# Patient Record
Sex: MALE | Race: WHITE | NOT HISPANIC OR LATINO | Employment: PART TIME | URBAN - NONMETROPOLITAN AREA
[De-identification: names, ages, dates, MRNs, and addresses within clinical notes are randomized per-mention and may not be internally consistent; named-entity substitution may affect disease eponyms.]

---

## 2023-12-21 ENCOUNTER — HOSPITAL ENCOUNTER (EMERGENCY)
Facility: HOSPITAL | Age: 28
Discharge: STILL ADMITTED OR ADMINISTRATIVE | DRG: 897 | End: 2023-12-21
Payer: MEDICAID

## 2023-12-21 ENCOUNTER — HOSPITAL ENCOUNTER (INPATIENT)
Facility: HOSPITAL | Age: 28
LOS: 6 days | Discharge: NURSING, REHAB OR LONG-TERM CARE | DRG: 897 | End: 2023-12-27
Attending: PSYCHIATRY & NEUROLOGY | Admitting: PSYCHIATRY & NEUROLOGY
Payer: MEDICAID

## 2023-12-21 VITALS
HEIGHT: 71 IN | RESPIRATION RATE: 17 BRPM | TEMPERATURE: 98.1 F | SYSTOLIC BLOOD PRESSURE: 138 MMHG | BODY MASS INDEX: 28.76 KG/M2 | DIASTOLIC BLOOD PRESSURE: 99 MMHG | HEART RATE: 103 BPM | OXYGEN SATURATION: 99 % | WEIGHT: 205.4 LBS

## 2023-12-21 DIAGNOSIS — F10.10 ALCOHOL ABUSE: Primary | ICD-10-CM

## 2023-12-21 PROBLEM — F19.20 CHEMICAL DEPENDENCY: Status: ACTIVE | Noted: 2023-12-21

## 2023-12-21 LAB
ALBUMIN SERPL-MCNC: 5 G/DL (ref 3.5–5.2)
ALBUMIN/GLOB SERPL: 1.7 G/DL
ALP SERPL-CCNC: 90 U/L (ref 39–117)
ALT SERPL W P-5'-P-CCNC: 25 U/L (ref 1–41)
AMPHET+METHAMPHET UR QL: NEGATIVE
AMPHETAMINES UR QL: NEGATIVE
ANION GAP SERPL CALCULATED.3IONS-SCNC: 10.2 MMOL/L (ref 5–15)
APAP SERPL-MCNC: <5 MCG/ML (ref 0–30)
AST SERPL-CCNC: 24 U/L (ref 1–40)
BACTERIA UR QL AUTO: NORMAL /HPF
BARBITURATES UR QL SCN: NEGATIVE
BASOPHILS # BLD AUTO: 0.07 10*3/MM3 (ref 0–0.2)
BASOPHILS NFR BLD AUTO: 0.8 % (ref 0–1.5)
BENZODIAZ UR QL SCN: NEGATIVE
BILIRUB SERPL-MCNC: 0.2 MG/DL (ref 0–1.2)
BILIRUB UR QL STRIP: NEGATIVE
BUN SERPL-MCNC: 11 MG/DL (ref 6–20)
BUN/CREAT SERPL: 10.5 (ref 7–25)
BUPRENORPHINE SERPL-MCNC: POSITIVE NG/ML
CALCIUM SPEC-SCNC: 10.1 MG/DL (ref 8.6–10.5)
CANNABINOIDS SERPL QL: NEGATIVE
CHLORIDE SERPL-SCNC: 104 MMOL/L (ref 98–107)
CLARITY UR: CLEAR
CO2 SERPL-SCNC: 23.8 MMOL/L (ref 22–29)
COCAINE UR QL: NEGATIVE
COLOR UR: ABNORMAL
CREAT SERPL-MCNC: 1.05 MG/DL (ref 0.76–1.27)
DEPRECATED RDW RBC AUTO: 41.1 FL (ref 37–54)
EGFRCR SERPLBLD CKD-EPI 2021: 97.9 ML/MIN/1.73
EOSINOPHIL # BLD AUTO: 0.15 10*3/MM3 (ref 0–0.4)
EOSINOPHIL NFR BLD AUTO: 1.6 % (ref 0.3–6.2)
ERYTHROCYTE [DISTWIDTH] IN BLOOD BY AUTOMATED COUNT: 11.8 % (ref 12.3–15.4)
ETHANOL BLD-MCNC: <10 MG/DL (ref 0–10)
ETHANOL UR QL: <0.01 %
FENTANYL UR-MCNC: NEGATIVE NG/ML
GLOBULIN UR ELPH-MCNC: 2.9 GM/DL
GLUCOSE SERPL-MCNC: 115 MG/DL (ref 65–99)
GLUCOSE UR STRIP-MCNC: NEGATIVE MG/DL
HCT VFR BLD AUTO: 48.6 % (ref 37.5–51)
HGB BLD-MCNC: 16 G/DL (ref 13–17.7)
HGB UR QL STRIP.AUTO: NEGATIVE
HOLD SPECIMEN: NORMAL
HOLD SPECIMEN: NORMAL
HYALINE CASTS UR QL AUTO: NORMAL /LPF
IMM GRANULOCYTES # BLD AUTO: 0.04 10*3/MM3 (ref 0–0.05)
IMM GRANULOCYTES NFR BLD AUTO: 0.4 % (ref 0–0.5)
KETONES UR QL STRIP: ABNORMAL
LEUKOCYTE ESTERASE UR QL STRIP.AUTO: NEGATIVE
LYMPHOCYTES # BLD AUTO: 2.62 10*3/MM3 (ref 0.7–3.1)
LYMPHOCYTES NFR BLD AUTO: 28.3 % (ref 19.6–45.3)
MAGNESIUM SERPL-MCNC: 2 MG/DL (ref 1.6–2.6)
MCH RBC QN AUTO: 31.3 PG (ref 26.6–33)
MCHC RBC AUTO-ENTMCNC: 32.9 G/DL (ref 31.5–35.7)
MCV RBC AUTO: 94.9 FL (ref 79–97)
METHADONE UR QL SCN: NEGATIVE
MONOCYTES # BLD AUTO: 0.54 10*3/MM3 (ref 0.1–0.9)
MONOCYTES NFR BLD AUTO: 5.8 % (ref 5–12)
NEUTROPHILS NFR BLD AUTO: 5.83 10*3/MM3 (ref 1.7–7)
NEUTROPHILS NFR BLD AUTO: 63.1 % (ref 42.7–76)
NITRITE UR QL STRIP: NEGATIVE
NRBC BLD AUTO-RTO: 0 /100 WBC (ref 0–0.2)
OPIATES UR QL: NEGATIVE
OXYCODONE UR QL SCN: NEGATIVE
PCP UR QL SCN: NEGATIVE
PH UR STRIP.AUTO: 8.5 [PH] (ref 5–8)
PLATELET # BLD AUTO: 303 10*3/MM3 (ref 140–450)
PMV BLD AUTO: 8.4 FL (ref 6–12)
POTASSIUM SERPL-SCNC: 4.3 MMOL/L (ref 3.5–5.2)
PROT SERPL-MCNC: 7.9 G/DL (ref 6–8.5)
PROT UR QL STRIP: ABNORMAL
RBC # BLD AUTO: 5.12 10*6/MM3 (ref 4.14–5.8)
RBC # UR STRIP: NORMAL /HPF
REF LAB TEST METHOD: NORMAL
SALICYLATES SERPL-MCNC: 0.3 MG/DL
SODIUM SERPL-SCNC: 138 MMOL/L (ref 136–145)
SP GR UR STRIP: >=1.03 (ref 1–1.03)
SQUAMOUS #/AREA URNS HPF: NORMAL /HPF
TRICYCLICS UR QL SCN: NEGATIVE
UROBILINOGEN UR QL STRIP: ABNORMAL
WBC # UR STRIP: NORMAL /HPF
WBC NRBC COR # BLD AUTO: 9.25 10*3/MM3 (ref 3.4–10.8)
WHOLE BLOOD HOLD COAG: NORMAL
WHOLE BLOOD HOLD SPECIMEN: NORMAL

## 2023-12-21 PROCEDURE — 82077 ASSAY SPEC XCP UR&BREATH IA: CPT | Performed by: STUDENT IN AN ORGANIZED HEALTH CARE EDUCATION/TRAINING PROGRAM

## 2023-12-21 PROCEDURE — 80143 DRUG ASSAY ACETAMINOPHEN: CPT | Performed by: STUDENT IN AN ORGANIZED HEALTH CARE EDUCATION/TRAINING PROGRAM

## 2023-12-21 PROCEDURE — 80307 DRUG TEST PRSMV CHEM ANLYZR: CPT | Performed by: STUDENT IN AN ORGANIZED HEALTH CARE EDUCATION/TRAINING PROGRAM

## 2023-12-21 PROCEDURE — 83735 ASSAY OF MAGNESIUM: CPT | Performed by: STUDENT IN AN ORGANIZED HEALTH CARE EDUCATION/TRAINING PROGRAM

## 2023-12-21 PROCEDURE — 93005 ELECTROCARDIOGRAM TRACING: CPT | Performed by: PSYCHIATRY & NEUROLOGY

## 2023-12-21 PROCEDURE — 80179 DRUG ASSAY SALICYLATE: CPT | Performed by: STUDENT IN AN ORGANIZED HEALTH CARE EDUCATION/TRAINING PROGRAM

## 2023-12-21 PROCEDURE — G0378 HOSPITAL OBSERVATION PER HR: HCPCS

## 2023-12-21 PROCEDURE — 36415 COLL VENOUS BLD VENIPUNCTURE: CPT

## 2023-12-21 PROCEDURE — 81001 URINALYSIS AUTO W/SCOPE: CPT | Performed by: STUDENT IN AN ORGANIZED HEALTH CARE EDUCATION/TRAINING PROGRAM

## 2023-12-21 PROCEDURE — 80053 COMPREHEN METABOLIC PANEL: CPT | Performed by: STUDENT IN AN ORGANIZED HEALTH CARE EDUCATION/TRAINING PROGRAM

## 2023-12-21 PROCEDURE — 85025 COMPLETE CBC W/AUTO DIFF WBC: CPT | Performed by: STUDENT IN AN ORGANIZED HEALTH CARE EDUCATION/TRAINING PROGRAM

## 2023-12-21 PROCEDURE — 99285 EMERGENCY DEPT VISIT HI MDM: CPT

## 2023-12-21 RX ORDER — ALUMINA, MAGNESIA, AND SIMETHICONE 2400; 2400; 240 MG/30ML; MG/30ML; MG/30ML
15 SUSPENSION ORAL EVERY 6 HOURS PRN
Status: DISCONTINUED | OUTPATIENT
Start: 2023-12-21 | End: 2023-12-27 | Stop reason: HOSPADM

## 2023-12-21 RX ORDER — ACETAMINOPHEN 325 MG/1
650 TABLET ORAL EVERY 6 HOURS PRN
Status: DISCONTINUED | OUTPATIENT
Start: 2023-12-21 | End: 2023-12-27 | Stop reason: HOSPADM

## 2023-12-21 RX ORDER — FAMOTIDINE 20 MG/1
20 TABLET, FILM COATED ORAL 2 TIMES DAILY PRN
Status: DISCONTINUED | OUTPATIENT
Start: 2023-12-21 | End: 2023-12-27 | Stop reason: HOSPADM

## 2023-12-21 RX ORDER — HYDROXYZINE 50 MG/1
50 TABLET, FILM COATED ORAL EVERY 6 HOURS PRN
Status: DISCONTINUED | OUTPATIENT
Start: 2023-12-21 | End: 2023-12-27 | Stop reason: HOSPADM

## 2023-12-21 RX ORDER — BENZONATATE 100 MG/1
100 CAPSULE ORAL 3 TIMES DAILY PRN
Status: DISCONTINUED | OUTPATIENT
Start: 2023-12-21 | End: 2023-12-27 | Stop reason: HOSPADM

## 2023-12-21 RX ORDER — ONDANSETRON 4 MG/1
4 TABLET, FILM COATED ORAL EVERY 6 HOURS PRN
Status: DISCONTINUED | OUTPATIENT
Start: 2023-12-21 | End: 2023-12-27 | Stop reason: HOSPADM

## 2023-12-21 RX ORDER — TRAZODONE HYDROCHLORIDE 50 MG/1
50 TABLET ORAL NIGHTLY PRN
Status: DISCONTINUED | OUTPATIENT
Start: 2023-12-21 | End: 2023-12-27 | Stop reason: HOSPADM

## 2023-12-21 RX ORDER — BENZTROPINE MESYLATE 1 MG/ML
1 INJECTION INTRAMUSCULAR; INTRAVENOUS ONCE AS NEEDED
Status: DISCONTINUED | OUTPATIENT
Start: 2023-12-21 | End: 2023-12-27 | Stop reason: HOSPADM

## 2023-12-21 RX ORDER — IBUPROFEN 400 MG/1
400 TABLET ORAL EVERY 6 HOURS PRN
Status: DISCONTINUED | OUTPATIENT
Start: 2023-12-21 | End: 2023-12-27 | Stop reason: HOSPADM

## 2023-12-21 RX ORDER — LOPERAMIDE HYDROCHLORIDE 2 MG/1
2 CAPSULE ORAL
Status: DISCONTINUED | OUTPATIENT
Start: 2023-12-21 | End: 2023-12-27 | Stop reason: HOSPADM

## 2023-12-21 RX ORDER — ECHINACEA PURPUREA EXTRACT 125 MG
2 TABLET ORAL AS NEEDED
Status: DISCONTINUED | OUTPATIENT
Start: 2023-12-21 | End: 2023-12-27 | Stop reason: HOSPADM

## 2023-12-21 RX ORDER — BENZTROPINE MESYLATE 1 MG/1
2 TABLET ORAL ONCE AS NEEDED
Status: DISCONTINUED | OUTPATIENT
Start: 2023-12-21 | End: 2023-12-27 | Stop reason: HOSPADM

## 2023-12-22 PROBLEM — F11.20 OPIOID USE DISORDER, SEVERE, DEPENDENCE: Status: ACTIVE | Noted: 2023-12-22

## 2023-12-22 PROBLEM — F10.10 ALCOHOL ABUSE: Status: ACTIVE | Noted: 2023-12-22

## 2023-12-22 PROBLEM — F10.20 ALCOHOL USE DISORDER, SEVERE, DEPENDENCE: Status: ACTIVE | Noted: 2023-12-21

## 2023-12-22 PROBLEM — F17.200 NICOTINE USE DISORDER: Status: ACTIVE | Noted: 2023-12-22

## 2023-12-22 LAB
QT INTERVAL: 432 MS
QTC INTERVAL: 432 MS

## 2023-12-22 PROCEDURE — 99222 1ST HOSP IP/OBS MODERATE 55: CPT | Performed by: PSYCHIATRY & NEUROLOGY

## 2023-12-22 PROCEDURE — 93010 ELECTROCARDIOGRAM REPORT: CPT | Performed by: INTERNAL MEDICINE

## 2023-12-22 PROCEDURE — 63710000001 ONDANSETRON PER 8 MG: Performed by: PSYCHIATRY & NEUROLOGY

## 2023-12-22 RX ORDER — LORAZEPAM 1 MG/1
1 TABLET ORAL EVERY 4 HOURS PRN
Status: ACTIVE | OUTPATIENT
Start: 2023-12-25 | End: 2023-12-26

## 2023-12-22 RX ORDER — LORAZEPAM 0.5 MG/1
0.5 TABLET ORAL EVERY 4 HOURS PRN
Status: ACTIVE | OUTPATIENT
Start: 2023-12-26 | End: 2023-12-27

## 2023-12-22 RX ORDER — CLONIDINE HYDROCHLORIDE 0.1 MG/1
0.1 TABLET ORAL 3 TIMES DAILY PRN
Status: ACTIVE | OUTPATIENT
Start: 2023-12-24 | End: 2023-12-25

## 2023-12-22 RX ORDER — CLONIDINE HYDROCHLORIDE 0.1 MG/1
0.1 TABLET ORAL 4 TIMES DAILY PRN
Status: ACTIVE | OUTPATIENT
Start: 2023-12-22 | End: 2023-12-23

## 2023-12-22 RX ORDER — LORAZEPAM 1 MG/1
1 TABLET ORAL
Qty: 3 TABLET | Refills: 0 | Status: COMPLETED | OUTPATIENT
Start: 2023-12-25 | End: 2023-12-25

## 2023-12-22 RX ORDER — LORAZEPAM 2 MG/1
2 TABLET ORAL EVERY 4 HOURS PRN
Status: DISPENSED | OUTPATIENT
Start: 2023-12-23 | End: 2023-12-24

## 2023-12-22 RX ORDER — HYDRALAZINE HYDROCHLORIDE 25 MG/1
25 TABLET, FILM COATED ORAL DAILY PRN
Status: ACTIVE | OUTPATIENT
Start: 2023-12-22 | End: 2023-12-27

## 2023-12-22 RX ORDER — LORAZEPAM 0.5 MG/1
0.5 TABLET ORAL
Qty: 3 TABLET | Refills: 0 | Status: COMPLETED | OUTPATIENT
Start: 2023-12-26 | End: 2023-12-26

## 2023-12-22 RX ORDER — DICYCLOMINE HYDROCHLORIDE 10 MG/1
10 CAPSULE ORAL 3 TIMES DAILY PRN
Status: ACTIVE | OUTPATIENT
Start: 2023-12-22 | End: 2023-12-27

## 2023-12-22 RX ORDER — LORAZEPAM 2 MG/1
2 TABLET ORAL
Status: DISPENSED | OUTPATIENT
Start: 2023-12-22 | End: 2023-12-23

## 2023-12-22 RX ORDER — LORAZEPAM 2 MG/1
2 TABLET ORAL
Qty: 3 TABLET | Refills: 0 | Status: COMPLETED | OUTPATIENT
Start: 2023-12-23 | End: 2023-12-23

## 2023-12-22 RX ORDER — CLONIDINE HYDROCHLORIDE 0.1 MG/1
0.1 TABLET ORAL 4 TIMES DAILY PRN
Status: DISPENSED | OUTPATIENT
Start: 2023-12-23 | End: 2023-12-24

## 2023-12-22 RX ORDER — CLONIDINE HYDROCHLORIDE 0.1 MG/1
0.1 TABLET ORAL ONCE AS NEEDED
Status: ACTIVE | OUTPATIENT
Start: 2023-12-26 | End: 2023-12-27

## 2023-12-22 RX ORDER — CLONIDINE HYDROCHLORIDE 0.1 MG/1
0.1 TABLET ORAL 2 TIMES DAILY PRN
Status: ACTIVE | OUTPATIENT
Start: 2023-12-25 | End: 2023-12-26

## 2023-12-22 RX ORDER — CYCLOBENZAPRINE HCL 10 MG
10 TABLET ORAL 3 TIMES DAILY PRN
Status: DISPENSED | OUTPATIENT
Start: 2023-12-22 | End: 2023-12-27

## 2023-12-22 RX ADMIN — ONDANSETRON HYDROCHLORIDE 4 MG: 4 TABLET, FILM COATED ORAL at 12:47

## 2023-12-22 RX ADMIN — IBUPROFEN 400 MG: 400 TABLET, FILM COATED ORAL at 20:45

## 2023-12-22 RX ADMIN — LORAZEPAM 2 MG: 2 TABLET ORAL at 20:46

## 2023-12-22 RX ADMIN — HYDROXYZINE HYDROCHLORIDE 50 MG: 50 TABLET ORAL at 20:46

## 2023-12-22 RX ADMIN — HYDROXYZINE HYDROCHLORIDE 50 MG: 50 TABLET ORAL at 12:47

## 2023-12-22 RX ADMIN — LORAZEPAM 2 MG: 2 TABLET ORAL at 13:08

## 2023-12-22 RX ADMIN — CYCLOBENZAPRINE 10 MG: 10 TABLET, FILM COATED ORAL at 20:46

## 2023-12-22 NOTE — PLAN OF CARE
Goal Outcome Evaluation:  Plan of Care Reviewed With: patient  Patient Agreement with Plan of Care: agrees     Progress: no change  Outcome Evaluation: PT SWITCHED FROM OBSERVATION TO ADMISSION THIS SHIFT. PT STARTED ON ATIVAN/CLONIDINE DETOX DUE TO WITHDRAWAL SYMPTOMS. PT REPORTS SLEEP AND APPETITE ARE VERY POOR. PT RATES ANXIETY A 10/10 AND DEPRESSION A 2/10 AND CRAVING 3/10. PT HAS BEEN THROWING UP BUT HAS IMPROVED WITH MEDICATION. NO S/S OF DISTRESS NTD THIS SHIFT.

## 2023-12-22 NOTE — NURSING NOTE
Spoke to  discussed pt labs, assessment, and vitals. New orders to admit pt to detox on observation status. RRBVOx2  ED provider made aware.

## 2023-12-22 NOTE — ED PROVIDER NOTES
"Subjective   History of Present Illness  27 y/o male that \"Alcohol abuse\" x several days. Patient states that he wound like to have detox.     History provided by:  Patient   used: No    Alcohol Intoxication  Location:  Alcohol  Severity:  Moderate  Onset quality:  Gradual  Duration:  2 days  Timing:  Intermittent  Progression:  Worsening  Chronicity:  New  Associated symptoms: no chest pain, no cough, no diarrhea, no ear pain, no fatigue, no headaches, no loss of consciousness, no myalgias, no rash, no rhinorrhea, no shortness of breath, no sore throat and no vomiting        Review of Systems   Constitutional: Negative.  Negative for appetite change, chills and fatigue.   HENT: Negative.  Negative for ear discharge, ear pain, rhinorrhea and sore throat.    Eyes: Negative.  Negative for pain.   Respiratory: Negative.  Negative for cough, choking, chest tightness and shortness of breath.    Cardiovascular: Negative.  Negative for chest pain and leg swelling.   Gastrointestinal: Negative.  Negative for diarrhea and vomiting.   Endocrine: Negative.  Negative for heat intolerance, polydipsia and polyuria.   Genitourinary: Negative.  Negative for difficulty urinating, flank pain, frequency and genital sores.   Musculoskeletal: Negative.  Negative for back pain, gait problem, joint swelling and myalgias.   Skin: Negative.  Negative for rash and wound.   Neurological: Negative.  Negative for seizures, loss of consciousness, speech difficulty, numbness and headaches.   Hematological: Negative.  Negative for adenopathy. Does not bruise/bleed easily.   Psychiatric/Behavioral: Negative.  Negative for decreased concentration, dysphoric mood and hallucinations.    All other systems reviewed and are negative.      History reviewed. No pertinent past medical history.    No Known Allergies    History reviewed. No pertinent surgical history.    History reviewed. No pertinent family history.    Social History "     Socioeconomic History    Marital status: Single           Objective   Physical Exam  Vitals and nursing note reviewed.   Constitutional:       General: He is not in acute distress.     Appearance: Normal appearance. He is normal weight. He is not ill-appearing, toxic-appearing or diaphoretic.   HENT:      Head: Normocephalic and atraumatic.      Right Ear: Tympanic membrane, ear canal and external ear normal. There is no impacted cerumen.      Left Ear: Tympanic membrane, ear canal and external ear normal. There is no impacted cerumen.      Nose: Nose normal. No congestion or rhinorrhea.      Mouth/Throat:      Mouth: Mucous membranes are moist.      Pharynx: Oropharynx is clear. No oropharyngeal exudate or posterior oropharyngeal erythema.   Eyes:      General: No scleral icterus.        Right eye: No discharge.         Left eye: No discharge.      Extraocular Movements: Extraocular movements intact.      Conjunctiva/sclera: Conjunctivae normal.      Pupils: Pupils are equal, round, and reactive to light.   Neck:      Vascular: No carotid bruit.   Cardiovascular:      Rate and Rhythm: Normal rate and regular rhythm.      Pulses: Normal pulses.      Heart sounds: Normal heart sounds. No murmur heard.     No friction rub. No gallop.   Pulmonary:      Effort: Pulmonary effort is normal. No respiratory distress.      Breath sounds: Normal breath sounds. No stridor. No wheezing, rhonchi or rales.   Chest:      Chest wall: No tenderness.   Abdominal:      General: Abdomen is flat. Bowel sounds are normal. There is no distension.      Palpations: Abdomen is soft. There is no mass.      Tenderness: There is no abdominal tenderness. There is no right CVA tenderness, left CVA tenderness, guarding or rebound.      Hernia: No hernia is present.   Musculoskeletal:         General: No swelling, tenderness, deformity or signs of injury. Normal range of motion.      Cervical back: Normal range of motion and neck supple. No  rigidity or tenderness.      Right lower leg: No edema.      Left lower leg: No edema.   Lymphadenopathy:      Cervical: No cervical adenopathy.   Skin:     General: Skin is warm and dry.      Capillary Refill: Capillary refill takes less than 2 seconds.      Coloration: Skin is not jaundiced or pale.      Findings: No bruising, erythema, lesion or rash.   Neurological:      General: No focal deficit present.      Mental Status: He is alert and oriented to person, place, and time. Mental status is at baseline.      Cranial Nerves: No cranial nerve deficit.      Sensory: No sensory deficit.      Motor: No weakness.      Coordination: Coordination normal.      Gait: Gait normal.      Deep Tendon Reflexes: Reflexes normal.   Psychiatric:         Mood and Affect: Mood normal.         Behavior: Behavior normal.         Thought Content: Thought content normal.         Judgment: Judgment normal.         Procedures           ED Course                                             Medical Decision Making      Final diagnoses:   Alcohol abuse       ED Disposition  ED Disposition       ED Disposition   DC/Transfer to Behavioral Health    [facility]   Stable    Comment   --               No follow-up provider specified.       Medication List      No changes were made to your prescriptions during this visit.            Ovidio Goldstein PA-C  12/21/23 5598

## 2023-12-22 NOTE — NURSING NOTE
Pt assessment completed. Pt states he was brought here by [facility]. States he is detoxing off suboxone and alcohol. Pt states he last drank alcohol this morning and lastnight. States he drank 2 pints of alcohol and used 1/2 quarter of suboxone. Pt adamantly denies SI/HI/AVH.   Pt COWS-7  Pt CIWA-7  Pt rates depression 3  Pt rates anxiety 8

## 2023-12-22 NOTE — PLAN OF CARE
Goal Outcome Evaluation:        Problem: Adult Behavioral Health Plan of Care  Goal: Patient-Specific Goal (Individualization)  Outcome: Ongoing, Progressing  Flowsheets  Taken 12/22/2023 1038  Patient-Specific Goals (Include Timeframe): Identify 2-3 coping skills, address relapse prevention methods, complete aftercare plans, and deny SI/HI prior to discharge.  Individualized Care Needs: Therapist to offer 1-4 therapy sessions, aftercare planning, safety planning, family education, group therapy, and brief CBT/MI interventions.  Anxieties, Fears or Concerns: none verbalized  Taken 12/22/2023 1029  Patient Personal Strengths:   resilient   resourceful   positive vocational history   stable living environment   motivated for treatment   motivated for recovery   family/social support  Patient Vulnerabilities:   poor impulse control   history of unsuccessful treatment   substance abuse/addiction  Goal: Optimized Coping Skills in Response to Life Stressors  Outcome: Ongoing, Progressing  Flowsheets (Taken 12/22/2023 1038)  Optimized Coping Skills in Response to Life Stressors: making progress toward outcome  Intervention: Promote Effective Coping Strategies  Flowsheets (Taken 12/22/2023 1038)  Supportive Measures:   active listening utilized   counseling provided   decision-making supported   goal-setting facilitated   self-reflection promoted   self-care encouraged   self-responsibility promoted   verbalization of feelings encouraged   positive reinforcement provided  Goal: Develops/Participates in Therapeutic [place] to Support Successful Transition  Outcome: Ongoing, Progressing  Flowsheets (Taken 12/22/2023 1038)  Develops/Participates in Therapeutic [place] to Support Successful Transition: making progress toward outcome  Intervention: Foster Therapeutic [place]  Flowsheets (Taken 12/22/2023 1038)  Trust Relationship/Rapport:   care explained   reassurance provided   choices provided   thoughts/feelings  acknowledged   emotional support provided   empathic listening provided   questions answered   questions encouraged  Intervention: Mutually Develop Transition Plan  Flowsheets  Taken 12/22/2023 1038  Outpatient/Agency/Support Group Needs: residential services  Transition Support:   follow-up care discussed   community resources reviewed   crisis management plan promoted   crisis management plan verbalized   follow-up care coordinated  Anticipated Discharge Disposition: residential substance use unit  Taken 12/22/2023 1037  Discharge Coordination/Progress: Patient does not have insurance coverage. Therapist met with patient to complete assessment. Patient presents from [facility].  Transportation Anticipated: agency  Transportation Concerns: no car  Current Discharge Risk: substance use/abuse  Concerns to be Addressed:   substance/tobacco abuse/use   cognitive/perceptual   coping/stress   mental health   discharge planning  Readmission Within the Last 30 Days: no previous admission in last 30 days  Patient/Family Anticipated Services at Transition: rehabilitation services  Patient's Choice of Community Agency(s): [facility]  Patient/Family Anticipates Transition to: inpatient rehabilitation facility  Offered/Gave Vendor List: no      DATA:      Therapist met individually with patient this date to introduce role and to discuss hospitalization expectations. Patient agreeable.     Consent signed for [facility].  Therapist spoke with Chikis at [facility], confirmed patient can return at discharge.      Clinical Maneuvering/Intervention:     Therapist assisted patient in processing above session content; acknowledged and normalized patient’s thoughts, feelings, and concerns.  Discussed the therapist/patient relationship and explain the parameters and limitations of relative confidentiality.  Also discussed the importance of active participation, and honesty to the treatment process.  Encouraged the patient to discuss/vent their  feelings, frustrations, and fears concerning their ongoing medical issues and validated their feelings.     Discussed the importance of finding enjoyable activities and coping skills that the patient can engage in a regular basis. Discussed healthy coping skills such as distraction, self love, grounding, thought challenges/reframing, etc.  Provided patient with list of healthy coping skills this date. Discussed the importance of medication compliance.  Praised the patient for seeking help and spent the majority of the session building rapport.       Allowed patient to freely discuss issues without interruption or judgment. Provided safe, confidential environment to facilitate the development of positive therapeutic relationship and encourage open, honest communication.      Therapist addressed discharge safety planning this date. Assisted patient in identifying risk factors which would indicate the need for higher level of care after discharge;  including thoughts to harm self or others and/or self-harming behavior. Encouraged patient to call 911, or present to the nearest emergency room should any of these events occur. Discussed crisis intervention services and means to access.  Encouraged securing any objects of harm.       Therapist completed integrated summary, treatment plan, and initiated social history this date.  Therapist is strongly encouraging family involvement in treatment.       ASSESSMENT:      The patient is a 27 y/o  male admitted for alcohol detox treatment. Therapist met with patient on this date to complete assessment. Patient denies SI/HI/AVH. Reports experiencing body aches, muscle cramps, tremors, nausea, diarrhea, vomiting, poor appetite. No past [facility] admissions. Patient began drinking at age 11, longest period of sobriety has been two years. Patient showed a lot of insight today, discussed using alcohol to cope with his anxiety and to silence anxious and racing thoughts.  He has the tendency to isolate himself which gives him more time to think, making the drinking worse. Therapist discussed healthy coping skills and relapse prevention with patient. Patient states that seeing his father pass away from alcohol abuse is a motivator for him to stay sober, also discussed his brother being in recovery. He is future oriented and looking forward to feeling like himself again. Patient was brought in from [facility] and plans to return at discharge, agency will provide transportation. He declines family involvement in treatment but states they are aware that he is here and safe.     PLAN:       Patient to remain hospitalized this date.     Treatment team will focus efforts on stabilizing patient's acute symptoms while providing education on healthy coping and crisis management to reduce hospitalizations.   Patient requires daily psychiatrist evaluation and 24/7 nursing supervision to promote patient  safety.     Therapist will offer 1-4 individual sessions, 1 therapy group daily, family education, and appropriate referral.    Therapist recommends [facility] residential rehab.

## 2023-12-22 NOTE — PLAN OF CARE
Goal Outcome Evaluation:  Plan of Care Reviewed With: patient  Patient Agreement with Plan of Care: agrees           Patient is a new admission this shift, admitted on observation status. Admission and orientation to unit complete.

## 2023-12-22 NOTE — DISCHARGE INSTR - APPOINTMENTS
[facility]  [id number]  [place] [address]  [phone]    Return at discharge

## 2023-12-22 NOTE — H&P
INITIAL PSYCHIATRIC HISTORY & PHYSICAL    Patient Identification:  Name:  Prasanna Thrasher  Age:  28 y.o.  Sex:  male  :  1995  MRN:  [id number]   Visit Number:  [id number]  Primary Care Physician:  Provider, No Known    SUBJECTIVE    CC/Focus of Exam: alcohol and opioid use    HPI: Prasanna Thrasher is a 28 y.o. male who was admitted under observation status on 2023 with complaints of alcohol and opioid use and withdrawals. The patient reports a long history of substance use. First use was at age 11 when he started smoking weed and drank alcohol and at age 15 he started doing pain pills and stopped using at age 18 but relapsed on Suboxone at 21 and also drank regularly. Over time the use increased and the patient  continued to use despite negative consequences. The patient endorses symptoms of tolerance and withdrawals. Has tried to cut down and stop but has not been successful. Spends too much time and resources in pursuit of substance use. Longest period of sobriety is reported to be 2 years.  Currently using 1-2 fifths of 100 proof vodka daily and last use was yesterday, Suboxone a quarter of a tablet daily, last use was yesterday am.   Withdrawal symptoms freezing, burning, body aches, muscle cramps, tremors.     PAST PSYCHIATRIC HX: None     SUBSTANCE USE HX: See HPI.     SOCIAL HX:   Social History     Socioeconomic History    Marital status: Single    Years of education: 11   Tobacco Use    Smoking status: Every Day     Packs/day: 1.00     Years: 20.00     Additional pack years: 0.00     Total pack years: 20.00     Types: Cigarettes    Smokeless tobacco: Former     Types: Chew   Vaping Use    Vaping Use: Never used   Substance and Sexual Activity    Alcohol use: Yes     Comment: Vodka 1/5th daily    Drug use: Yes     Comment: Suboxone 1/2 Strip daily    Sexual activity: Defer     Partners: Female         History reviewed. No pertinent past medical history.     History reviewed. No pertinent  surgical history.    History reviewed. No pertinent family history.      No medications prior to admission.         ALLERGIES:  Patient has no known allergies.    Temp:  [97.5 °F (36.4 °C)-98.1 °F (36.7 °C)] 97.5 °F (36.4 °C)  Heart Rate:  [] 67  Resp:  [16-18] 16  BP: (123-138)/(76-99) 134/91    REVIEW OF SYSTEMS:  Review of Systems   Constitutional:  Positive for chills, diaphoresis and fatigue.   HENT: Negative.     Respiratory: Negative.     Cardiovascular: Negative.    Gastrointestinal:  Positive for diarrhea and nausea.   Endocrine: Negative.    Musculoskeletal: Negative.    Skin: Negative.    Neurological:  Positive for tremors and weakness.   Hematological: Negative.    Psychiatric/Behavioral:  Positive for dysphoric mood. The patient is nervous/anxious.         OBJECTIVE    PHYSICAL EXAM:  Physical Exam  Constitutional:  Appears well-developed and well-nourished.   HENT:   Head: Normocephalic and atraumatic.   Right Ear: External ear normal.   Left Ear: External ear normal.   Mouth/Throat: Oropharynx is clear and moist.   Eyes: Pupils are equal, round, and reactive to light. Conjunctivae and EOM are normal.   Neck: Normal range of motion. Neck supple.   Cardiovascular: Normal rate, regular rhythm and normal heart sounds.    Respiratory: Effort normal and breath sounds normal. No respiratory distress. No wheezes.   GI: Soft. Bowel sounds are normal.No distension. There is no tenderness.   Musculoskeletal: Normal range of motion. No edema or deformity.   Neurological:No cranial nerve deficit. Coordination normal.   Skin: Skin is warm and dry. No rash noted. No erythema.     MENTAL STATUS EXAM:   Hygiene:   fair  Cooperation:  Cooperative  Eye Contact:  Fair  Psychomotor Behavior:  Appropriate  Affect:  Appropriate  Hopelessness: Denies  Speech:  Normal  Thought Process: Goal directed  Thought Content:  Normal  Suicidal:  None  Homicidal:  None  Hallucinations:  None  Delusion:  None  Memory:   Intact  Orientation:  Person, Place, Time and Situation  Reliability:  fair  Insight:  Fair  Judgement:  Fair  Impulse Control:  Fair    Imaging Results (Last 24 Hours)       ** No results found for the last 24 hours. **             ECG/EMG Results (most recent)       Procedure Component Value Units Date/Time    ECG 12 Lead Other; Baseline Cardiac Status [[id number]] Collected: 12/22/23 0059     Updated: 12/22/23 0132     QT Interval 432 ms      QTC Interval 432 ms     Narrative:      Test Reason : Other~  Blood Pressure :   */*   mmHG  Vent. Rate :  60 BPM     Atrial Rate :  60 BPM     P-R Int : 148 ms          QRS Dur : 100 ms      QT Int : 432 ms       P-R-T Axes :  49  39  49 degrees     QTc Int : 432 ms    Normal sinus rhythm with sinus arrhythmia  Normal ECG  When compared with ECG of 22-DEC-2023 00:59, (Unconfirmed)  No significant change was found    Referred By:            Confirmed By:              Lab Results   Component Value Date    GLUCOSE 115 (H) 12/21/2023    BUN 11 12/21/2023    CREATININE 1.05 12/21/2023    BCR 10.5 12/21/2023    CO2 23.8 12/21/2023    CALCIUM 10.1 12/21/2023    ALBUMIN 5.0 12/21/2023    AST 24 12/21/2023    ALT 25 12/21/2023       Lab Results   Component Value Date    WBC 9.25 12/21/2023    HGB 16.0 12/21/2023    HCT 48.6 12/21/2023    MCV 94.9 12/21/2023     12/21/2023       Last Urine Toxicity          Latest Ref Rng & Units 12/21/2023   LAST URINE TOXICITY RESULTS   Amphetamine, Urine Qual Negative Negative    Barbiturates Screen, Urine Negative Negative    Benzodiazepine Screen, Urine Negative Negative    Buprenorphine, Screen, Urine Negative Positive    Cocaine Screen, Urine Negative Negative    Fentanyl, Urine Negative Negative    Methadone Screen , Urine Negative Negative    Methamphetamine, Ur Negative Negative        Brief Urine Lab Results  (Last result in the past 365 days)        Color   Clarity   Blood   Leuk Est   Nitrite   Protein   CREAT   Urine HCG         12/21/23 2037 Dark Yellow   Clear   Negative   Negative   Negative   30 mg/dL (1+)                   DATA  Labs reviewed.   EKG reviewed.   JOSUE reviewed.   Record reviewed.     Strengths: Motivated for treatment    Weaknesses:Substance use and Poor coping skills    Code status:  Full  Discussed code status with patient.    ASSESSMENT & PLAN:    Hospital bed: No      Alcohol use disorder, severe, dependence    Alcohol withdrawals  -Continue to monitor and may start on Ativan detox if indicated.      Opioid use disorder, severe, dependence  -Continue to monitor and may start on clonidine detox if indicated      Nicotine use disorder  -Encouraged cessation    The patient has been admitted under observation status for safety and stabilization.  Patient will be monitored for withdrawals and maintained on Special Precautions Level 4 (q30 min checks).  The patient will have individual and group therapy with a master's level therapist. A master treatment plan will be developed and agreed upon by the patient and his/her treatment team.  The patient's estimated length of stay in the hospital is 3-5 days.

## 2023-12-22 NOTE — NURSING NOTE
Pt came to nurses station stating that he vomited, requesting medication for nausea and anxiety. CIWA 11. Dr. Martin made aware, new order to admit pt on Ativan and Clonidine detox protocol. [id number]

## 2023-12-23 PROCEDURE — 99232 SBSQ HOSP IP/OBS MODERATE 35: CPT | Performed by: PSYCHIATRY & NEUROLOGY

## 2023-12-23 RX ADMIN — CYCLOBENZAPRINE 10 MG: 10 TABLET, FILM COATED ORAL at 21:15

## 2023-12-23 RX ADMIN — LORAZEPAM 2 MG: 2 TABLET ORAL at 08:26

## 2023-12-23 RX ADMIN — HYDROXYZINE HYDROCHLORIDE 50 MG: 50 TABLET ORAL at 08:22

## 2023-12-23 RX ADMIN — LORAZEPAM 2 MG: 2 TABLET ORAL at 21:15

## 2023-12-23 RX ADMIN — HYDROXYZINE HYDROCHLORIDE 50 MG: 50 TABLET ORAL at 21:15

## 2023-12-23 RX ADMIN — CYCLOBENZAPRINE 10 MG: 10 TABLET, FILM COATED ORAL at 08:22

## 2023-12-23 RX ADMIN — LORAZEPAM 2 MG: 2 TABLET ORAL at 14:25

## 2023-12-23 NOTE — PROGRESS NOTES
"INPATIENT PSYCHIATRIC PROGRESS NOTE    Name:  Prasanna Thrasher  :  1995  MRN:  [id number]  Visit Number:  [id number]  Length of stay:  2    SUBJECTIVE    CC/Focus of Exam: Alcohol and opiate dependence    INTERVAL HISTORY:  First time seeing patient.  Chart, notes, vitals, labs and EKG personally reviewed.  Glucose 115, UDS + buprenorphine    Patient reports mild improvement in withdrawal symptoms, specifically insomnia.  Patient reports continued myalgias, nausea, diarrhea, chills.    Depression rating 6/10  Anxiety rating 10  Sleep: Mildly improved  Withdrawal sx: Per HPI  Cravin/10    Review of Systems   Constitutional:  Positive for chills.   Respiratory: Negative.     Cardiovascular: Negative.    Gastrointestinal:  Positive for diarrhea and nausea.   Musculoskeletal:  Positive for myalgias.   Psychiatric/Behavioral:  Positive for dysphoric mood. The patient is nervous/anxious.        OBJECTIVE    Temp:  [96.9 °F (36.1 °C)-99.2 °F (37.3 °C)] 97.4 °F (36.3 °C)  Heart Rate:  [62-95] 73  Resp:  [16-18] 18  BP: (125-145)/(67-92) 143/89    MENTAL STATUS EXAM:  Appearance: Casually dressed, good hygeine.   Cooperation: Cooperative  Psychomotor: No psychomotor agitation/retardation, No EPS, No motor tics  Speech: normal rate, amount.  Mood: \"A little better\"   Affect: congruent, restricted  Thought Content: goal directed, no delusional material present  Thought process: linear, organized.  Suicidality: No SI  Homicidality: No HI  Perception: No AH/VH  Insight: fair   Judgment: fair    Lab Results (last 24 hours)       ** No results found for the last 24 hours. **               Imaging Results (Last 24 Hours)       ** No results found for the last 24 hours. **               ECG/EMG Results (most recent)       Procedure Component Value Units Date/Time    ECG 12 Lead Other; Baseline Cardiac Status [[id number]] Collected: 23     Updated: 23     QT Interval 432 ms      QTC Interval 432 ms  "    Narrative:      Test Reason : Other~  Blood Pressure :   */*   mmHG  Vent. Rate :  60 BPM     Atrial Rate :  60 BPM     P-R Int : 148 ms          QRS Dur : 100 ms      QT Int : 432 ms       P-R-T Axes :  49  39  49 degrees     QTc Int : 432 ms    Normal sinus rhythm with sinus arrhythmia  Normal ECG  When compared with ECG of 22-DEC-2023 00:59, (Unconfirmed)  No significant change was found  Confirmed by Sarwat Wray ([id number]) on 12/22/2023 6:19:04 PM    Referred By:            Confirmed By: Sarwat Wray             ALLERGIES: Patient has no known allergies.      Current Facility-Administered Medications:     acetaminophen (TYLENOL) tablet 650 mg, 650 mg, Oral, Q6H PRN, Vin Santos MD    aluminum-magnesium hydroxide-simethicone (MAALOX MAX) 400-400-40 MG/5ML suspension 15 mL, 15 mL, Oral, Q6H PRN, Vin Santos MD    benzonatate (TESSALON) capsule 100 mg, 100 mg, Oral, TID PRN, Vin Santos MD    benztropine (COGENTIN) tablet 2 mg, 2 mg, Oral, Once PRN **OR** benztropine (COGENTIN) injection 1 mg, 1 mg, Intramuscular, Once PRN, Vin Santos MD    cloNIDine (CATAPRES) tablet 0.1 mg, 0.1 mg, Oral, 4x Daily PRN **FOLLOWED BY** [START ON 12/24/2023] cloNIDine (CATAPRES) tablet 0.1 mg, 0.1 mg, Oral, TID PRN **FOLLOWED BY** [START ON 12/25/2023] cloNIDine (CATAPRES) tablet 0.1 mg, 0.1 mg, Oral, BID PRN **FOLLOWED BY** [START ON 12/26/2023] cloNIDine (CATAPRES) tablet 0.1 mg, 0.1 mg, Oral, Once PRN, Darren Martin MD    cyclobenzaprine (FLEXERIL) tablet 10 mg, 10 mg, Oral, TID PRN, Darren Martin MD, 10 mg at 12/23/23 0822    dicyclomine (BENTYL) capsule 10 mg, 10 mg, Oral, TID PRN, Darren Martin MD    famotidine (PEPCID) tablet 20 mg, 20 mg, Oral, BID PRN, Vin Santos MD    hydrALAZINE (APRESOLINE) tablet 25 mg, 25 mg, Oral, Daily PRN, Darren Martin MD    hydrOXYzine (ATARAX) tablet 50 mg, 50 mg, Oral, Q6H PRN, Vin Santos MD, 50 mg at 12/23/23 0822    ibuprofen (ADVIL,MOTRIN) tablet 400  mg, 400 mg, Oral, Q6H PRN, Vin Santos MD, 400 mg at 12/22/23 2045    loperamide (IMODIUM) capsule 2 mg, 2 mg, Oral, Q2H PRN, Vin Santos MD    LORazepam (ATIVAN) tablet 2 mg, 2 mg, Oral, 3 times per day, 2 mg at 12/23/23 0826 **FOLLOWED BY** [START ON 12/24/2023] LORazepam (ATIVAN) tablet 1.5 mg, 1.5 mg, Oral, 3 times per day **FOLLOWED BY** [START ON 12/25/2023] LORazepam (ATIVAN) tablet 1 mg, 1 mg, Oral, 3 times per day **FOLLOWED BY** [START ON 12/26/2023] LORazepam (ATIVAN) tablet 0.5 mg, 0.5 mg, Oral, 3 times per day, Darren Martin MD    LORazepam (ATIVAN) tablet 2 mg, 2 mg, Oral, Q4H PRN **FOLLOWED BY** [START ON 12/24/2023] LORazepam (ATIVAN) tablet 1.5 mg, 1.5 mg, Oral, Q4H PRN **FOLLOWED BY** [START ON 12/25/2023] LORazepam (ATIVAN) tablet 1 mg, 1 mg, Oral, Q4H PRN **FOLLOWED BY** [START ON 12/26/2023] LORazepam (ATIVAN) tablet 0.5 mg, 0.5 mg, Oral, Q4H PRN, Darren Martin MD    magnesium hydroxide (MILK OF MAGNESIA) suspension 10 mL, 10 mL, Oral, Daily PRN, Vin Santos MD    ondansetron (ZOFRAN) tablet 4 mg, 4 mg, Oral, Q6H PRN, Vin Santos MD, 4 mg at 12/22/23 1247    sodium chloride nasal spray 2 spray, 2 spray, Each Nare, PRN, Vin Santos MD    traZODone (DESYREL) tablet 50 mg, 50 mg, Oral, Nightly PRN, Vin Santos MD    Reviewed chart, notes, vitals, labs and EKG personally reviewed.    ASSESSMENT & PLAN:        Alcohol use disorder, severe, dependence    Opioid use disorder, severe, dependence    Nicotine use disorder    Alcohol abuse    Alcohol use disorder, severe, dependence, in withdrawal  -Admitted for medically assisted detox  -Continue Ativan detox protocol  -Supplementary vitamins and comfort meds ordered  -We will encourage rehab or other intensive substance abuse treatment following discharge    Opioid use disorder, severe, dependence, in withdrawal  -Admitted for medically assisted detox  -Admission UDS positive for buprenorphine  -Placed on clonidine  detox protocol  -We will refer for substance abuse treatment following hospitalization       Nicotine use disorder  -Encouraged cessation     The patient has been admitted under observation status for safety and stabilization.  Patient will be monitored for withdrawals and maintained on Special Precautions Level 4 (q30 min checks).  The patient will have individual and group therapy with a master's level therapist. A master treatment plan will be developed and agreed upon by the patient and his/her treatment team.  The patient's estimated length of stay in the hospital is 3-5 days.     Special precautions: Special Precautions Level 4 (q30 min checks)    Behavioral Health Treatment Plan and Problem List: I have reviewed and approved the Behavioral Health Treatment Plan and Problem list.  The patient has had a chance to review and agrees with the treatment plan.    I have reviewed the copied text and it is accurate as of 12/23/23     Clinician:  Ace Waters MD  12/23/23  10:23 EST

## 2023-12-23 NOTE — PLAN OF CARE
Problem: Adult Behavioral Health Plan of Care  Goal: Plan of Care Review  Outcome: Ongoing, Progressing  Flowsheets  Taken 12/23/2023 5289  Progress: improving  Plan of Care Reviewed With: patient  Patient Agreement with Plan of Care: agrees  Taken 12/23/2023 1337  Plan of Care Reviewed With: patient  Patient Agreement with Plan of Care: agrees   Goal Outcome Evaluation:  Plan of Care Reviewed With: patient  Patient Agreement with Plan of Care: agrees     Progress: improving

## 2023-12-23 NOTE — PLAN OF CARE
Goal Outcome Evaluation:  Plan of Care Reviewed With: patient  Patient Agreement with Plan of Care: agrees     Progress: improving     Pt slept well and appetite is good. Pt given Ativan 2mg PRN, Flexeril, Atarax, and Trazodone prn medications.

## 2023-12-24 PROCEDURE — 63710000001 ONDANSETRON PER 8 MG: Performed by: PSYCHIATRY & NEUROLOGY

## 2023-12-24 PROCEDURE — 99232 SBSQ HOSP IP/OBS MODERATE 35: CPT | Performed by: PSYCHIATRY & NEUROLOGY

## 2023-12-24 RX ORDER — ROPINIROLE 0.25 MG/1
0.5 TABLET, FILM COATED ORAL 3 TIMES DAILY
Status: DISCONTINUED | OUTPATIENT
Start: 2023-12-24 | End: 2023-12-27 | Stop reason: HOSPADM

## 2023-12-24 RX ADMIN — LORAZEPAM 1.5 MG: 1 TABLET ORAL at 08:14

## 2023-12-24 RX ADMIN — ONDANSETRON HYDROCHLORIDE 4 MG: 4 TABLET, FILM COATED ORAL at 08:14

## 2023-12-24 RX ADMIN — LORAZEPAM 2 MG: 2 TABLET ORAL at 02:19

## 2023-12-24 RX ADMIN — LORAZEPAM 1.5 MG: 1 TABLET ORAL at 21:32

## 2023-12-24 RX ADMIN — ROPINIROLE HYDROCHLORIDE 0.5 MG: 0.25 TABLET, FILM COATED ORAL at 21:32

## 2023-12-24 RX ADMIN — HYDROXYZINE HYDROCHLORIDE 50 MG: 50 TABLET ORAL at 16:09

## 2023-12-24 RX ADMIN — CLONIDINE HYDROCHLORIDE 0.1 MG: 0.1 TABLET ORAL at 03:20

## 2023-12-24 RX ADMIN — ROPINIROLE HYDROCHLORIDE 0.5 MG: 0.25 TABLET, FILM COATED ORAL at 12:11

## 2023-12-24 RX ADMIN — IBUPROFEN 400 MG: 400 TABLET, FILM COATED ORAL at 08:14

## 2023-12-24 RX ADMIN — LORAZEPAM 1.5 MG: 1 TABLET ORAL at 14:29

## 2023-12-24 RX ADMIN — ROPINIROLE HYDROCHLORIDE 0.5 MG: 0.25 TABLET, FILM COATED ORAL at 16:09

## 2023-12-24 RX ADMIN — HYDROXYZINE HYDROCHLORIDE 50 MG: 50 TABLET ORAL at 08:14

## 2023-12-24 RX ADMIN — CYCLOBENZAPRINE 10 MG: 10 TABLET, FILM COATED ORAL at 21:32

## 2023-12-24 RX ADMIN — TRAZODONE HYDROCHLORIDE 50 MG: 50 TABLET ORAL at 21:32

## 2023-12-24 NOTE — PLAN OF CARE
"Goal Outcome Evaluation:  Plan of Care Reviewed With: patient  Patient Agreement with Plan of Care: agrees         Pt reported sleeping fair at night. Pt stated that he was unable to rate his anxiety and depression at this time that he was \"unsure\". Pt rated cravings 0/10. Pt denies SI/HI/AVH. Pt was given prn flexeril and atarax. No acute s/s of distress noted.          "

## 2023-12-24 NOTE — PLAN OF CARE
Goal Outcome Evaluation:  Plan of Care Reviewed With: patient  Patient Agreement with Plan of Care: agrees     Progress: improving  Outcome Evaluation: Pt has been calm and cooperative this shift. Pt reports appetite is good but he is having trouble staying asleep. Pt rates anxiety a 5/10 and depression a 7/10. Pt has had no complaints this shift.

## 2023-12-24 NOTE — PROGRESS NOTES
"INPATIENT PSYCHIATRIC PROGRESS NOTE    Name:  Prasanna Thrasher  :  1995  MRN:  [id number]  Visit Number:  [id number]  Length of stay:  3    SUBJECTIVE    CC/Focus of Exam: Alcohol and opiate dependence    INTERVAL HISTORY:  Patient reports worsening withdrawal symptoms, specifically myalgias, insomnia, cramping/nausea, mild diarrhea.    Depression rating 6/10  Anxiety rating 6/10  Sleep: Mildly improved  Withdrawal sx: Per HPI  Cravin/10    Review of Systems   Constitutional: Negative.    Respiratory: Negative.     Cardiovascular: Negative.    Gastrointestinal:  Positive for abdominal pain, diarrhea and nausea.   Musculoskeletal:  Positive for myalgias.   Psychiatric/Behavioral:  Positive for dysphoric mood and sleep disturbance. The patient is nervous/anxious.        OBJECTIVE    Temp:  [97.4 °F (36.3 °C)-98.9 °F (37.2 °C)] 98 °F (36.7 °C)  Heart Rate:  [] 61  Resp:  [18] 18  BP: (112-144)/(68-87) 112/68    MENTAL STATUS EXAM:  Appearance: Casually dressed, good hygeine.   Cooperation: Cooperative  Psychomotor: No psychomotor agitation/retardation, No EPS, No motor tics  Speech: normal rate, amount.  Mood: \"worse today\"   Affect: congruent, restricted  Thought Content: goal directed, no delusional material present  Thought process: linear, organized.  Suicidality: No SI  Homicidality: No HI  Perception: No AH/VH  Insight: fair   Judgment: fair    Lab Results (last 24 hours)       ** No results found for the last 24 hours. **               Imaging Results (Last 24 Hours)       ** No results found for the last 24 hours. **               ECG/EMG Results (most recent)       Procedure Component Value Units Date/Time    ECG 12 Lead Other; Baseline Cardiac Status [[id number]] Collected: 23 005     Updated: 23     QT Interval 432 ms      QTC Interval 432 ms     Narrative:      Test Reason : Other~  Blood Pressure :   */*   mmHG  Vent. Rate :  60 BPM     Atrial Rate :  60 BPM     P-R Int : " 148 ms          QRS Dur : 100 ms      QT Int : 432 ms       P-R-T Axes :  49  39  49 degrees     QTc Int : 432 ms    Normal sinus rhythm with sinus arrhythmia  Normal ECG  When compared with ECG of 22-DEC-2023 00:59, (Unconfirmed)  No significant change was found  Confirmed by Sarwat Wray () on 2023 6:19:04 PM    Referred By:            Confirmed By: Sarwat Wray             ALLERGIES: Patient has no known allergies.      Current Facility-Administered Medications:     acetaminophen (TYLENOL) tablet 650 mg, 650 mg, Oral, Q6H PRN, Vin Santos MD    aluminum-magnesium hydroxide-simethicone (MAALOX MAX) 400-400-40 MG/5ML suspension 15 mL, 15 mL, Oral, Q6H PRN, Vin Santos MD    benzonatate (TESSALON) capsule 100 mg, 100 mg, Oral, TID PRN, Vin Santos MD    benztropine (COGENTIN) tablet 2 mg, 2 mg, Oral, Once PRN **OR** benztropine (COGENTIN) injection 1 mg, 1 mg, Intramuscular, Once PRN, Vin Santos MD    [] cloNIDine (CATAPRES) tablet 0.1 mg, 0.1 mg, Oral, 4x Daily PRN, 0.1 mg at 23 0320 **FOLLOWED BY** cloNIDine (CATAPRES) tablet 0.1 mg, 0.1 mg, Oral, TID PRN **FOLLOWED BY** [START ON 2023] cloNIDine (CATAPRES) tablet 0.1 mg, 0.1 mg, Oral, BID PRN **FOLLOWED BY** [START ON 2023] cloNIDine (CATAPRES) tablet 0.1 mg, 0.1 mg, Oral, Once PRN, Darren Martin MD    cyclobenzaprine (FLEXERIL) tablet 10 mg, 10 mg, Oral, TID PRN, Darren Martin MD, 10 mg at 23    dicyclomine (BENTYL) capsule 10 mg, 10 mg, Oral, TID PRN, Darren Martin MD    famotidine (PEPCID) tablet 20 mg, 20 mg, Oral, BID PRN, Vin Santos MD    hydrALAZINE (APRESOLINE) tablet 25 mg, 25 mg, Oral, Daily PRN, Darren Martin MD    hydrOXYzine (ATARAX) tablet 50 mg, 50 mg, Oral, Q6H PRN, Vin Santos MD, 50 mg at 23 0814    ibuprofen (ADVIL,MOTRIN) tablet 400 mg, 400 mg, Oral, Q6H PRN, Vin Santos MD, 400 mg at 23 0814    loperamide (IMODIUM) capsule 2 mg, 2 mg,  Oral, Q2H PRN, Vin Santos MD    [COMPLETED] LORazepam (ATIVAN) tablet 2 mg, 2 mg, Oral, 3 times per day, 2 mg at 23 **FOLLOWED BY** LORazepam (ATIVAN) tablet 1.5 mg, 1.5 mg, Oral, 3 times per day, 1.5 mg at 2314 **FOLLOWED BY** [START ON 2023] LORazepam (ATIVAN) tablet 1 mg, 1 mg, Oral, 3 times per day **FOLLOWED BY** [START ON 2023] LORazepam (ATIVAN) tablet 0.5 mg, 0.5 mg, Oral, 3 times per day, Darren Martin MD    [] LORazepam (ATIVAN) tablet 2 mg, 2 mg, Oral, Q4H PRN, 2 mg at 23 **FOLLOWED BY** LORazepam (ATIVAN) tablet 1.5 mg, 1.5 mg, Oral, Q4H PRN **FOLLOWED BY** [START ON 2023] LORazepam (ATIVAN) tablet 1 mg, 1 mg, Oral, Q4H PRN **FOLLOWED BY** [START ON 2023] LORazepam (ATIVAN) tablet 0.5 mg, 0.5 mg, Oral, Q4H PRN, Darren Martin MD    magnesium hydroxide (MILK OF MAGNESIA) suspension 10 mL, 10 mL, Oral, Daily PRN, Vin Santos MD    ondansetron (ZOFRAN) tablet 4 mg, 4 mg, Oral, Q6H PRN, Vin Santos MD, 4 mg at 2314    sodium chloride nasal spray 2 spray, 2 spray, Each Nare, PRN, Vin Santos MD    traZODone (DESYREL) tablet 50 mg, 50 mg, Oral, Nightly PRN, Vin Santos MD    Reviewed chart, notes, vitals, labs and EKG personally reviewed.    ASSESSMENT & PLAN:        Alcohol use disorder, severe, dependence    Opioid use disorder, severe, dependence    Nicotine use disorder    Alcohol abuse    Alcohol use disorder, severe, dependence, in withdrawal  -Admitted for medically assisted detox  -Continue Ativan detox protocol  -Supplementary vitamins and comfort meds ordered  -We will encourage rehab or other intensive substance abuse treatment following discharge    Opioid use disorder, severe, dependence, in withdrawal  -Admitted for medically assisted detox  -Admission UDS positive for buprenorphine  -Placed on clonidine detox protocol  -We will refer for substance abuse treatment following hospitalization        Nicotine use disorder  -Encouraged cessation     The patient has been admitted under observation status for safety and stabilization.  Patient will be monitored for withdrawals and maintained on Special Precautions Level 4 (q30 min checks).  The patient will have individual and group therapy with a master's level therapist. A master treatment plan will be developed and agreed upon by the patient and his/her treatment team.  The patient's estimated length of stay in the hospital is 2-4 days.     Special precautions: Special Precautions Level 4 (q30 min checks)    Behavioral Health Treatment Plan and Problem List: I have reviewed and approved the Behavioral Health Treatment Plan and Problem list.  The patient has had a chance to review and agrees with the treatment plan.    I have reviewed the copied text and it is accurate as of 12/24/23     Clinician:  Ace Waters MD  12/24/23  11:09 EST

## 2023-12-25 PROCEDURE — 99232 SBSQ HOSP IP/OBS MODERATE 35: CPT | Performed by: PSYCHIATRY & NEUROLOGY

## 2023-12-25 RX ORDER — BUPRENORPHINE 2 MG/1
2 TABLET SUBLINGUAL DAILY
Status: COMPLETED | OUTPATIENT
Start: 2023-12-26 | End: 2023-12-26

## 2023-12-25 RX ORDER — BUPRENORPHINE 2 MG/1
2 TABLET SUBLINGUAL 2 TIMES DAILY
Status: COMPLETED | OUTPATIENT
Start: 2023-12-25 | End: 2023-12-25

## 2023-12-25 RX ADMIN — ROPINIROLE HYDROCHLORIDE 0.5 MG: 0.25 TABLET, FILM COATED ORAL at 16:20

## 2023-12-25 RX ADMIN — ROPINIROLE HYDROCHLORIDE 0.5 MG: 0.25 TABLET, FILM COATED ORAL at 21:08

## 2023-12-25 RX ADMIN — BUPRENORPHINE HCL 2 MG: 2 TABLET SUBLINGUAL at 21:08

## 2023-12-25 RX ADMIN — ROPINIROLE HYDROCHLORIDE 0.5 MG: 0.25 TABLET, FILM COATED ORAL at 08:10

## 2023-12-25 RX ADMIN — LORAZEPAM 1 MG: 1 TABLET ORAL at 08:10

## 2023-12-25 RX ADMIN — LORAZEPAM 1 MG: 1 TABLET ORAL at 21:09

## 2023-12-25 RX ADMIN — BUPRENORPHINE HCL 2 MG: 2 TABLET SUBLINGUAL at 12:16

## 2023-12-25 RX ADMIN — HYDROXYZINE HYDROCHLORIDE 50 MG: 50 TABLET ORAL at 16:25

## 2023-12-25 RX ADMIN — LORAZEPAM 1 MG: 1 TABLET ORAL at 14:29

## 2023-12-25 NOTE — PROGRESS NOTES
"INPATIENT PSYCHIATRIC PROGRESS NOTE    Name:  Prasanna Thrasher  :  1995  MRN:  [id number]  Visit Number:  [id number]  Length of stay:  4    SUBJECTIVE    CC/Focus of Exam: Alcohol and opioid use    INTERVAL HISTORY:  The patient reports he is not feeling good. Reports ongoing withdrawals from buprenorphine. He states he is experiencing body aches, back pain, leg cramps, pins and needles all over, joint pain, difficult to move.   Depression rating 5/10  Anxiety rating 8/10  Sleep: Not good  Withdrawal sx: Per HPI.   Cravin/10    Review of Systems   Constitutional:  Positive for chills, diaphoresis and fatigue.   Musculoskeletal:  Positive for arthralgias, back pain, myalgias and neck pain.   Psychiatric/Behavioral:  Positive for dysphoric mood and sleep disturbance. The patient is nervous/anxious.        OBJECTIVE    Temp:  [97 °F (36.1 °C)-98.6 °F (37 °C)] 97 °F (36.1 °C)  Heart Rate:  [75-90] 75  Resp:  [16-18] 16  BP: (109-148)/(66-92) 109/66    MENTAL STATUS EXAM:  Appearance:Casually dressed, good hygeine.   Cooperation:Cooperative  Psychomotor: No psychomotor agitation/retardation, No EPS, No motor tics  Speech-normal rate, amount.  Mood \"anxious\"   Affect-congruent, appropriate, stable  Thought Content-goal directed, no delusional material present  Thought process-linear, organized.  Suicidality: No SI  Homicidality: No HI  Perception: No AH/VH  Insight-fair   Judgement-fair    Lab Results (last 24 hours)       ** No results found for the last 24 hours. **               Imaging Results (Last 24 Hours)       ** No results found for the last 24 hours. **               ECG/EMG Results (most recent)       Procedure Component Value Units Date/Time    ECG 12 Lead Other; Baseline Cardiac Status [[id number]] Collected: 23     Updated: 23     QT Interval 432 ms      QTC Interval 432 ms     Narrative:      Test Reason : Other~  Blood Pressure :   */*   mmHG  Vent. Rate :  60 BPM     " Atrial Rate :  60 BPM     P-R Int : 148 ms          QRS Dur : 100 ms      QT Int : 432 ms       P-R-T Axes :  49  39  49 degrees     QTc Int : 432 ms    Normal sinus rhythm with sinus arrhythmia  Normal ECG  When compared with ECG of 22-DEC-2023 00:59, (Unconfirmed)  No significant change was found  Confirmed by Sarwat Wray () on 2023 6:19:04 PM    Referred By:            Confirmed By: Sarwat Wray             ALLERGIES: Patient has no known allergies.    Medication Review:   Scheduled Medications:  LORazepam, 1 mg, Oral, 3 times per day   Followed by  [START ON 2023] LORazepam, 0.5 mg, Oral, 3 times per day  rOPINIRole, 0.5 mg, Oral, TID         PRN Medications:    acetaminophen    aluminum-magnesium hydroxide-simethicone    benzonatate    benztropine **OR** benztropine    [] cloNIDine **FOLLOWED BY** [] cloNIDine **FOLLOWED BY** cloNIDine **FOLLOWED BY** [START ON 2023] cloNIDine    cyclobenzaprine    dicyclomine    famotidine    hydrALAZINE    hydrOXYzine    ibuprofen    loperamide    [] LORazepam **FOLLOWED BY** [] LORazepam **FOLLOWED BY** LORazepam **FOLLOWED BY** [START ON 2023] LORazepam    magnesium hydroxide    ondansetron    sodium chloride    traZODone   All medications reviewed.    ASSESSMENT & PLAN:    Alcohol use disorder, severe, dependence, in withdrawal  -Admitted for medically assisted detox  -Continue Ativan detox protocol  -Supplementary vitamins and comfort meds ordered  -We will encourage rehab or other intensive substance abuse treatment following discharge     Opioid use disorder, severe, dependence, in withdrawal  -Admitted for medically assisted detox  -Admission UDS positive for buprenorphine  -Placed on clonidine detox protocol  -Start short Subutex protocol  -We will refer for substance abuse treatment following hospitalization       Nicotine use disorder  -Encouraged cessation    Special precautions: Special Precautions Level 4  (q30 min checks).    Behavioral Health Treatment Plan and Problem List: I have reviewed and approved the Behavioral Health Treatment Plan and Problem list.  The patient has had a chance to review and agrees with the treatment plan.    Copied text in portions of this note has been reviewed and is accurate as of 12/25/23         Clinician:  Darren Martin MD  12/25/23  11:06 EST

## 2023-12-25 NOTE — PLAN OF CARE
Goal Outcome Evaluation:  Plan of Care Reviewed With: patient  Patient Agreement with Plan of Care: agrees     Progress: improving  Outcome Evaluation: Pt calm and cooperative this shift. Reports bodyaches and restlessness. Denies SI/HI/AVH. No distress noted.

## 2023-12-25 NOTE — PLAN OF CARE
Goal Outcome Evaluation:  Plan of Care Reviewed With: patient  Patient Agreement with Plan of Care: agrees        Outcome Evaluation: Pt reports good appetite and poor sleep. Anxiety 10/10 and depression 6/10. Denies SI/HI and AVH. Cravings 4/10.

## 2023-12-26 PROCEDURE — 99232 SBSQ HOSP IP/OBS MODERATE 35: CPT | Performed by: PSYCHIATRY & NEUROLOGY

## 2023-12-26 RX ADMIN — LORAZEPAM 0.5 MG: 0.5 TABLET ORAL at 21:07

## 2023-12-26 RX ADMIN — ROPINIROLE HYDROCHLORIDE 0.5 MG: 0.25 TABLET, FILM COATED ORAL at 15:14

## 2023-12-26 RX ADMIN — HYDROXYZINE HYDROCHLORIDE 50 MG: 50 TABLET ORAL at 08:15

## 2023-12-26 RX ADMIN — CYCLOBENZAPRINE 10 MG: 10 TABLET, FILM COATED ORAL at 21:07

## 2023-12-26 RX ADMIN — LORAZEPAM 0.5 MG: 0.5 TABLET ORAL at 14:26

## 2023-12-26 RX ADMIN — LORAZEPAM 0.5 MG: 0.5 TABLET ORAL at 08:15

## 2023-12-26 RX ADMIN — ROPINIROLE HYDROCHLORIDE 0.5 MG: 0.25 TABLET, FILM COATED ORAL at 21:07

## 2023-12-26 RX ADMIN — HYDROXYZINE HYDROCHLORIDE 50 MG: 50 TABLET ORAL at 14:26

## 2023-12-26 RX ADMIN — TRAZODONE HYDROCHLORIDE 50 MG: 50 TABLET ORAL at 21:07

## 2023-12-26 RX ADMIN — CYCLOBENZAPRINE 10 MG: 10 TABLET, FILM COATED ORAL at 08:15

## 2023-12-26 RX ADMIN — IBUPROFEN 400 MG: 400 TABLET, FILM COATED ORAL at 08:15

## 2023-12-26 RX ADMIN — ROPINIROLE HYDROCHLORIDE 0.5 MG: 0.25 TABLET, FILM COATED ORAL at 08:15

## 2023-12-26 RX ADMIN — BUPRENORPHINE HCL 2 MG: 2 TABLET SUBLINGUAL at 08:15

## 2023-12-26 RX ADMIN — HYDROXYZINE HYDROCHLORIDE 50 MG: 50 TABLET ORAL at 21:07

## 2023-12-26 NOTE — PLAN OF CARE
Problem: Adult Behavioral Health Plan of Care  Goal: Plan of Care Review  Outcome: Ongoing, Progressing  Flowsheets (Taken 12/26/2023 8585)  Progress: improving  Plan of Care Reviewed With: patient  Patient Agreement with Plan of Care: agrees  Outcome Evaluation: Pt pleasant and cooperative.Interacts well with peers.   Goal Outcome Evaluation:  Plan of Care Reviewed With: patient  Patient Agreement with Plan of Care: agrees     Progress: improving  Outcome Evaluation: Pt pleasant and cooperative.Interacts well with peers.

## 2023-12-26 NOTE — PLAN OF CARE
Goal Outcome Evaluation:        Problem: Adult Behavioral Health Plan of Care  Goal: Patient-Specific Goal (Individualization)  Outcome: Ongoing, Progressing  Flowsheets  Taken 12/22/2023 1038  Patient-Specific Goals (Include Timeframe): Identify 2-3 coping skills, address relapse prevention methods, complete aftercare plans, and deny SI/HI prior to discharge.  Individualized Care Needs: Therapist to offer 1-4 therapy sessions, aftercare planning, safety planning, family education, group therapy, and brief CBT/MI interventions.  Anxieties, Fears or Concerns: none verbalized  Taken 12/22/2023 1029  Patient Personal Strengths:   resilient   resourceful   positive vocational history   stable living environment   motivated for treatment   motivated for recovery   family/social support  Patient Vulnerabilities:   poor impulse control   history of unsuccessful treatment   substance abuse/addiction  Goal: Optimized Coping Skills in Response to Life Stressors  Outcome: Ongoing, Progressing  Flowsheets (Taken 12/22/2023 1038)  Optimized Coping Skills in Response to Life Stressors: making progress toward outcome  Intervention: Promote Effective Coping Strategies  Flowsheets (Taken 12/26/2023 1033)  Supportive Measures:   active listening utilized   counseling provided   decision-making supported   goal-setting facilitated   self-reflection promoted   self-care encouraged   verbalization of feelings encouraged   self-responsibility promoted   positive reinforcement provided  Goal: Develops/Participates in Therapeutic [place] to Support Successful Transition  Outcome: Ongoing, Progressing  Flowsheets (Taken 12/22/2023 1038)  Develops/Participates in Therapeutic [place] to Support Successful Transition: making progress toward outcome  Intervention: Foster Therapeutic [place]  Flowsheets (Taken 12/26/2023 1033)  Trust Relationship/Rapport:   care explained   reassurance provided   choices provided   thoughts/feelings  acknowledged   emotional support provided   questions answered   empathic listening provided   questions encouraged  Intervention: Mutually Develop Transition Plan  Flowsheets  Taken 12/26/2023 1032  Discharge Coordination/Progress: Patient does not have insurance coverage. Patient to return to [facility] at discharge, agency to transport.  Transportation Anticipated: agency  Transportation Concerns: no car  Current Discharge Risk: substance use/abuse  Concerns to be Addressed:   substance/tobacco abuse/use   cognitive/perceptual   coping/stress   mental health   discharge planning  Readmission Within the Last 30 Days: no previous admission in last 30 days  Patient/Family Anticipated Services at Transition: rehabilitation services  Patient's Choice of Community Agency(s): [facility]  Patient/Family Anticipates Transition to: inpatient rehabilitation facility  Offered/Gave Vendor List: no  Taken 12/22/2023 1038  Outpatient/Agency/Support Group Needs: residential services  Transition Support:   follow-up care discussed   community resources reviewed   crisis management plan promoted   crisis management plan verbalized   follow-up care coordinated  Anticipated Discharge Disposition: residential substance use unit         DATA:  Therapist met with Patient individually this date. Patient agreeable to discuss current treatment progress and discharge concerns.     CLINICAL MANUVERING/INTERVENTIONS:  Assisted Patient in processing session content; acknowledged and normalized Patient’s thoughts, feelings, and concerns by utilizing a person-centered approach in efforts to build appropriate rapport and a positive therapeutic relationship with open and honest communication. Allowed Patient to ventilate regarding current stressors and triggers for negative emotions and thoughts in a safe nonjudgmental environment with unconditional positive regard, active listening skills, and empathy. Therapist implemented motivational interviewing  techniques to assist Patient with exploring personal growth and change and discussed distress tolerance skills, self soothing techniques, and applied cognitive behavioral strategies to facilitate identification of maladaptive patterns of thinking and behavior.Therapist utilized dialectical behavior techniques to teach and model emotional regulation and relaxation methods. Therapist assisted Patient with identifying and implementing healthier coping strategies. Therapist assisted Patient with safety planning; Patient agreed to continue honest communication with Treatment Team while inpatient and identify any SI/HI.  Patient encouraged to seek nearest ER or contact 911 if danger to self or others post discharge.     ASSESSMENT:    Patient continues to receive treatment for alcohol detox, expects to discharge back to [facility] tomorrow. Agency to provide transportation. Patient reports high anxiety and moderate depression, denies SI/HI/AVH. Patient reports improvement in withdrawal symptoms. Healthy coping skills and relapse prevention have been reviewed. Patient discussed being anxious about the whole recovery process and how he's going to do at rehab, offered emotional support and encouragement.     PLAN:   Patient will continue stabilization. Patient will continue to receive services offered by Treatment Team.     Patient to return to [facility].

## 2023-12-26 NOTE — PLAN OF CARE
Goal Outcome Evaluation:  Plan of Care Reviewed With: patient  Patient Agreement with Plan of Care: agrees     Progress: improving  Outcome Evaluation: Patient reported mild W/D symptoms. Anx 10 and depression 7. Patient has remained in room majority of shift. Denied SI/HI/AVH. Cooperative during assessment

## 2023-12-26 NOTE — PROGRESS NOTES
"INPATIENT PSYCHIATRIC PROGRESS NOTE    Name:  Prasanna Thrasher  :  1995  MRN:  [id number]  Visit Number:  [id number]  Length of stay:  5    SUBJECTIVE    CC/Focus of Exam: Alcohol and opioid use    INTERVAL HISTORY:  The patient reports he is feeling better today and the withdrawals are not as bad as they were. He took last dose of Subutex today and is on the last day of Ativan detox.   Depression rating 7/10  Anxiety rating 10/10  Sleep: Good  Withdrawal sx: Mild   Cravin/10    Review of Systems   Constitutional: Negative.    Musculoskeletal:  Positive for myalgias.   Psychiatric/Behavioral:  Positive for dysphoric mood. The patient is nervous/anxious.        OBJECTIVE    Temp:  [96.8 °F (36 °C)-99.3 °F (37.4 °C)] 96.8 °F (36 °C)  Heart Rate:  [] 84  Resp:  [16-18] 16  BP: (125-137)/(68-88) 128/87    MENTAL STATUS EXAM:  Appearance:Casually dressed, good hygeine.   Cooperation:Cooperative  Psychomotor: No psychomotor agitation/retardation, No EPS, No motor tics  Speech-normal rate, amount.  Mood \"anxious\"   Affect-congruent, appropriate, stable  Thought Content-goal directed, no delusional material present  Thought process-linear, organized.  Suicidality: No SI  Homicidality: No HI  Perception: No AH/VH  Insight-fair   Judgement-fair    Lab Results (last 24 hours)       ** No results found for the last 24 hours. **               Imaging Results (Last 24 Hours)       ** No results found for the last 24 hours. **               ECG/EMG Results (most recent)       Procedure Component Value Units Date/Time    ECG 12 Lead Other; Baseline Cardiac Status [[id number]] Collected: 23 005     Updated: 23     QT Interval 432 ms      QTC Interval 432 ms     Narrative:      Test Reason : Other~  Blood Pressure :   */*   mmHG  Vent. Rate :  60 BPM     Atrial Rate :  60 BPM     P-R Int : 148 ms          QRS Dur : 100 ms      QT Int : 432 ms       P-R-T Axes :  49  39  49 degrees     QTc Int : 432 " ms    Normal sinus rhythm with sinus arrhythmia  Normal ECG  When compared with ECG of 22-DEC-2023 00:59, (Unconfirmed)  No significant change was found  Confirmed by Sarwat Wray () on 2023 6:19:04 PM    Referred By:            Confirmed By: Sarwat Wray             ALLERGIES: Patient has no known allergies.    Medication Review:   Scheduled Medications:  LORazepam, 0.5 mg, Oral, 3 times per day  rOPINIRole, 0.5 mg, Oral, TID         PRN Medications:    acetaminophen    aluminum-magnesium hydroxide-simethicone    benzonatate    benztropine **OR** benztropine    [] cloNIDine **FOLLOWED BY** [] cloNIDine **FOLLOWED BY** [] cloNIDine **FOLLOWED BY** cloNIDine    cyclobenzaprine    dicyclomine    famotidine    hydrALAZINE    hydrOXYzine    ibuprofen    loperamide    [] LORazepam **FOLLOWED BY** [] LORazepam **FOLLOWED BY** [] LORazepam **FOLLOWED BY** LORazepam    magnesium hydroxide    ondansetron    sodium chloride    traZODone   All medications reviewed.    ASSESSMENT & PLAN:    Alcohol use disorder, severe, dependence, in withdrawal  -Admitted for medically assisted detox  -Continue Ativan detox protocol  -Supplementary vitamins and comfort meds ordered  -We will encourage rehab or other intensive substance abuse treatment following discharge     Opioid use disorder, severe, dependence, in withdrawal  -Admitted for medically assisted detox  -Admission UDS positive for buprenorphine  -Placed on clonidine detox protocol  -Short Subutex protocol  -We will refer for substance abuse treatment following hospitalization       Nicotine use disorder  -Encouraged cessation    Special precautions: Special Precautions Level 4 (q30 min checks).    Behavioral Health Treatment Plan and Problem List: I have reviewed and approved the Behavioral Health Treatment Plan and Problem list.  The patient has had a chance to review and agrees with the treatment plan.    Copied text in  portions of this note has been reviewed and is accurate as of 12/26/23         Clinician:  Darren Martin MD  12/26/23  09:30 EST

## 2023-12-27 VITALS
DIASTOLIC BLOOD PRESSURE: 74 MMHG | TEMPERATURE: 97.1 F | HEIGHT: 72 IN | BODY MASS INDEX: 26.25 KG/M2 | SYSTOLIC BLOOD PRESSURE: 127 MMHG | RESPIRATION RATE: 18 BRPM | WEIGHT: 193.8 LBS | OXYGEN SATURATION: 97 % | HEART RATE: 73 BPM

## 2023-12-27 PROCEDURE — 99238 HOSP IP/OBS DSCHRG MGMT 30/<: CPT | Performed by: PSYCHIATRY & NEUROLOGY

## 2023-12-27 RX ORDER — TRAZODONE HYDROCHLORIDE 50 MG/1
50 TABLET ORAL NIGHTLY PRN
Qty: 30 TABLET | Refills: 0 | Status: SHIPPED | OUTPATIENT
Start: 2023-12-27 | End: 2023-12-27 | Stop reason: SDUPTHER

## 2023-12-27 RX ORDER — TRAZODONE HYDROCHLORIDE 50 MG/1
50 TABLET ORAL NIGHTLY PRN
Qty: 30 TABLET | Refills: 0 | Status: SHIPPED | OUTPATIENT
Start: 2023-12-27

## 2023-12-27 RX ADMIN — ROPINIROLE HYDROCHLORIDE 0.5 MG: 0.25 TABLET, FILM COATED ORAL at 08:16

## 2023-12-27 NOTE — PLAN OF CARE
Goal Outcome Evaluation:  Plan of Care Reviewed With: patient  Patient Agreement with Plan of Care: agrees     Progress: improving  Outcome Evaluation: Pt rates anxiety 10/10, depression 5/10 and craving 4/10. Pt denies SI/HI/AVH. Pt lists multiple w/d sx but is resting quietly in no apparent distress upon assessment.

## 2023-12-27 NOTE — PLAN OF CARE
Goal Outcome Evaluation:  Plan of Care Reviewed With: patient  Patient Agreement with Plan of Care: agrees

## 2023-12-27 NOTE — CASE MANAGEMENT/SOCIAL WORK
Patient Name:  Prasanna Thrasher  YOB: 1995  MRN: [id number]  Admit Date:  12/21/2023    Patient expected to discharge back to [facility] on this date, agency to provide transportation. Patient reports improvement in mood and withdrawal symptoms, denies SI/HI/AVH. Healthy coping skills and relapse prevention have been reviewed with patient. Assisted patient in identifying risk factors which would indicate the need for higher level of care including thoughts to harm self or others and/or self-harming behavior. Encouraged patient to notify facility staff, call [phone] or present to the nearest emergency room should any of these events occur.     Electronically signed by:  VU Monahan  12/27/23 09:23 EST

## 2023-12-27 NOTE — DISCHARGE SUMMARY
":  1995  MRN:  [id number]  Visit Number:  [id number]      Date of Admission:2023   Date of Discharge:  2023    Discharge Diagnosis:  Principal Problem:    Alcohol use disorder, severe, dependence  Active Problems:    Opioid use disorder, severe, dependence    Nicotine use disorder        Admission Diagnosis:  Chemical dependency [F19.20]  Alcohol abuse [F10.10]     URMILA Thrasher is a 28 y.o. male who was admitted under observation status on 2023 with complaints of alcohol and opioid use and withdrawals.   For details please see H&P dated 23.    Hospital Course  Patient is a 28 y.o. male presented with alcohol and opioid use and withdrawals. The patient was admitted to the [facility] detox recovery unit for safety, further evaluation and treatment.  The patient was at first admitted under observation status but was changed to regular admission as he started experiencing worsening of the withdrawal symptoms.  He was started on Ativan detox and clonidine detox. Short Subutex detox was added due to ongoing opioid withdrawal symptoms.   The patient was also able to take part in individual and group counseling sessions and work on appropriate coping skills.  The patient made steady improvement in his withdrawals mood and expressed feeling more positive and hopeful about future. Sleep and appetite were improved.  The day of discharge the patient was calm, cooperative and pleasant. Mood was reported to be anxious, and denied SI/HI/AVH. Also reported no medication side effects.  .      Mental Status Exam upon discharge:   Mood \"anxious\"   Affect-congruent, appropriate, stable  Thought Content-goal directed, no delusional material present  Thought process-linear, organized.  Suicidality: No SI  Homicidality: No HI  Perception: No AH/VH    Procedures Performed         Consults:   Consults       No orders found from 2023 to 2023.            Pertinent Test Results: "   Admission on 12/21/2023   Component Date Value Ref Range Status    QT Interval 12/22/2023 432  ms Final    QTC Interval 12/22/2023 432  ms Final   Admission on 12/21/2023, Discharged on 12/21/2023   Component Date Value Ref Range Status    Color, UA 12/21/2023 Dark Yellow (A)  Yellow, Straw Final    Appearance, UA 12/21/2023 Clear  Clear Final    pH, UA 12/21/2023 8.5 (H)  5.0 - 8.0 Final    Specific Gravity, UA 12/21/2023 >=1.030  1.005 - 1.030 Final    Glucose, UA 12/21/2023 Negative  Negative Final    Ketones, UA 12/21/2023 Trace (A)  Negative Final    Bilirubin, UA 12/21/2023 Negative  Negative Final    Blood, UA 12/21/2023 Negative  Negative Final    Protein, UA 12/21/2023 30 mg/dL (1+) (A)  Negative Final    Leuk Esterase, UA 12/21/2023 Negative  Negative Final    Nitrite, UA 12/21/2023 Negative  Negative Final    Urobilinogen, UA 12/21/2023 1.0 E.U./dL  0.2 - 1.0 E.U./dL Final    Glucose 12/21/2023 115 (H)  65 - 99 mg/dL Final    BUN 12/21/2023 11  6 - 20 mg/dL Final    Creatinine 12/21/2023 1.05  0.76 - 1.27 mg/dL Final    Sodium 12/21/2023 138  136 - 145 mmol/L Final    Potassium 12/21/2023 4.3  3.5 - 5.2 mmol/L Final    Slight hemolysis detected by analyzer. Result may be falsely elevated.    Chloride 12/21/2023 104  98 - 107 mmol/L Final    CO2 12/21/2023 23.8  22.0 - 29.0 mmol/L Final    Calcium 12/21/2023 10.1  8.6 - 10.5 mg/dL Final    Total Protein 12/21/2023 7.9  6.0 - 8.5 g/dL Final    Albumin 12/21/2023 5.0  3.5 - 5.2 g/dL Final    ALT (SGPT) 12/21/2023 25  1 - 41 U/L Final    AST (SGOT) 12/21/2023 24  1 - 40 U/L Final    Alkaline Phosphatase 12/21/2023 90  39 - 117 U/L Final    Total Bilirubin 12/21/2023 0.2  0.0 - 1.2 mg/dL Final    Globulin 12/21/2023 2.9  gm/dL Final    A/G Ratio 12/21/2023 1.7  g/dL Final    BUN/Creatinine Ratio 12/21/2023 10.5  7.0 - 25.0 Final    Anion Gap 12/21/2023 10.2  5.0 - 15.0 mmol/L Final    eGFR 12/21/2023 97.9  >60.0 mL/min/1.73 Final    Fentanyl, Urine  12/21/2023 Negative  Negative Final    Ethanol 12/21/2023 <10  0 - 10 mg/dL Final    Ethanol % 12/21/2023 <0.010  % Final    Magnesium 12/21/2023 2.0  1.6 - 2.6 mg/dL Final    Acetaminophen 12/21/2023 <5.0  0.0 - 30.0 mcg/mL Final    Salicylate 12/21/2023 0.3  <=30.0 mg/dL Final    THC, Screen, Urine 12/21/2023 Negative  Negative Final    Phencyclidine (PCP), Urine 12/21/2023 Negative  Negative Final    Cocaine Screen, Urine 12/21/2023 Negative  Negative Final    Methamphetamine, Ur 12/21/2023 Negative  Negative Final    Opiate Screen 12/21/2023 Negative  Negative Final    Amphetamine Screen, Urine 12/21/2023 Negative  Negative Final    Benzodiazepine Screen, Urine 12/21/2023 Negative  Negative Final    Tricyclic Antidepressants Screen 12/21/2023 Negative  Negative Final    Methadone Screen, Urine 12/21/2023 Negative  Negative Final    Barbiturates Screen, Urine 12/21/2023 Negative  Negative Final    Oxycodone Screen, Urine 12/21/2023 Negative  Negative Final    Buprenorphine, Screen, Urine 12/21/2023 Positive (A)  Negative Final    Extra Tube 12/21/2023 Hold for add-ons.   Final    Auto resulted.    Extra Tube 12/21/2023 hold for add-on   Final    Auto resulted    Extra Tube 12/21/2023 Hold for add-ons.   Final    Auto resulted.    Extra Tube 12/21/2023 Hold for add-ons.   Final    Auto resulted    WBC 12/21/2023 9.25  3.40 - 10.80 10*3/mm3 Final    RBC 12/21/2023 5.12  4.14 - 5.80 10*6/mm3 Final    Hemoglobin 12/21/2023 16.0  13.0 - 17.7 g/dL Final    Hematocrit 12/21/2023 48.6  37.5 - 51.0 % Final    MCV 12/21/2023 94.9  79.0 - 97.0 fL Final    MCH 12/21/2023 31.3  26.6 - 33.0 pg Final    MCHC 12/21/2023 32.9  31.5 - 35.7 g/dL Final    RDW 12/21/2023 11.8 (L)  12.3 - 15.4 % Final    RDW-SD 12/21/2023 41.1  37.0 - 54.0 fl Final    MPV 12/21/2023 8.4  6.0 - 12.0 fL Final    Platelets 12/21/2023 303  140 - 450 10*3/mm3 Final    Neutrophil % 12/21/2023 63.1  42.7 - 76.0 % Final    Lymphocyte % 12/21/2023 28.3  19.6 -  45.3 % Final    Monocyte % 12/21/2023 5.8  5.0 - 12.0 % Final    Eosinophil % 12/21/2023 1.6  0.3 - 6.2 % Final    Basophil % 12/21/2023 0.8  0.0 - 1.5 % Final    Immature Grans % 12/21/2023 0.4  0.0 - 0.5 % Final    Neutrophils, Absolute 12/21/2023 5.83  1.70 - 7.00 10*3/mm3 Final    Lymphocytes, Absolute 12/21/2023 2.62  0.70 - 3.10 10*3/mm3 Final    Monocytes, Absolute 12/21/2023 0.54  0.10 - 0.90 10*3/mm3 Final    Eosinophils, Absolute 12/21/2023 0.15  0.00 - 0.40 10*3/mm3 Final    Basophils, Absolute 12/21/2023 0.07  0.00 - 0.20 10*3/mm3 Final    Immature Grans, Absolute 12/21/2023 0.04  0.00 - 0.05 10*3/mm3 Final    nRBC 12/21/2023 0.0  0.0 - 0.2 /100 WBC Final    RBC, UA 12/21/2023 None Seen  None Seen, 0-2 /HPF Final    WBC, UA 12/21/2023 0-2  None Seen, 0-2 /HPF Final    Bacteria, UA 12/21/2023 None Seen  None Seen /HPF Final    Squamous Epithelial Cells, UA 12/21/2023 0-2  None Seen, 0-2 /HPF Final    Hyaline Casts, UA 12/21/2023 None Seen  None Seen /LPF Final    Methodology 12/21/2023 Automated Microscopy   Final        Condition on Discharge:  improved    Vital Signs  Temp:  [97 °F (36.1 °C)-97.9 °F (36.6 °C)] 97.1 °F (36.2 °C)  Heart Rate:  [73-96] 73  Resp:  [18] 18  BP: (102-138)/(51-94) 127/74      Discharge Disposition:  Rehab Facility or Unit (DC - External)    Discharge Medications:     Discharge Medications        New Medications        Instructions Start Date   traZODone 50 MG tablet  Commonly known as: DESYREL   50 mg, Oral, Nightly PRN               Discharge Diet:    Diet Instructions    AS TOLERATED           Activity at Discharge:    Activity Instructions    AS TOLERATED           Follow-up Appointments  [facility]      Time: I spent  < 30  minutes on this discharge activity which included: face-to-face encounter with the patient, reviewing the data in the system, coordination of the care with the nursing staff as well as consultants, documentation, and entering orders.         Clinician:   Darren Martin MD  12/27/23  11:38 EST